# Patient Record
Sex: MALE | Race: OTHER
[De-identification: names, ages, dates, MRNs, and addresses within clinical notes are randomized per-mention and may not be internally consistent; named-entity substitution may affect disease eponyms.]

---

## 2020-01-29 ENCOUNTER — HOSPITAL ENCOUNTER (EMERGENCY)
Dept: HOSPITAL 54 - ER | Age: 20
Discharge: LEFT BEFORE DISCHARGE | End: 2020-01-29
Payer: COMMERCIAL

## 2020-01-29 VITALS — BODY MASS INDEX: 20.27 KG/M2 | WEIGHT: 163 LBS | HEIGHT: 75 IN

## 2020-01-29 VITALS — SYSTOLIC BLOOD PRESSURE: 101 MMHG | DIASTOLIC BLOOD PRESSURE: 65 MMHG

## 2020-01-29 DIAGNOSIS — M54.42: Primary | ICD-10-CM

## 2020-01-29 PROCEDURE — 99283 EMERGENCY DEPT VISIT LOW MDM: CPT

## 2020-01-29 PROCEDURE — 96372 THER/PROPH/DIAG INJ SC/IM: CPT

## 2020-01-29 RX ADMIN — Medication ONE EACH: at 03:52

## 2020-01-29 RX ADMIN — KETOROLAC TROMETHAMINE ONE MG: 30 INJECTION, SOLUTION INTRAMUSCULAR at 03:52

## 2020-01-29 RX ADMIN — CARISOPRODOL ONE MG: 350 TABLET ORAL at 03:52

## 2020-01-29 RX ADMIN — DEXTROSE MONOHYDRATE ONE MG: 50 INJECTION, SOLUTION INTRAVENOUS at 03:53

## 2020-01-29 NOTE — NUR
PT CAME INTO THE ED C/O L LEG PAIN X 1 WEEK. WORSE TODAY. "I HAVE DIFFICULTY 
WALKING" AS PER PT. PT AAOX4, VSS, RESPIRATIONS EVEN AND UNLABORED ON RA W/ NAD 
NOTED. PT CONNECTED TO THE MONITOR AND POX